# Patient Record
Sex: FEMALE | Race: WHITE | HISPANIC OR LATINO | Employment: STUDENT | URBAN - METROPOLITAN AREA
[De-identification: names, ages, dates, MRNs, and addresses within clinical notes are randomized per-mention and may not be internally consistent; named-entity substitution may affect disease eponyms.]

---
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
## 2025-01-05 NOTE — ED PROVIDER NOTES
Time reflects when diagnosis was documented in both MDM as applicable and the Disposition within this note       Time User Action Codes Description Comment    12/28/2024 12:54 AM Neymar Dacosta [G43.909] Migraine     12/28/2024 12:54 AM Neymar Dacosta [M54.9] Back pain           ED Disposition       ED Disposition   Discharge    Condition   Stable    Date/Time   Sat Dec 28, 2024 12:53 AM    Comment   Apoorva Tomlinson discharge to home/self care.                   Assessment & Plan       Medical Decision Making  Patient is a 19 y.o. female with PMH of cyst of the brain, who presents to the ED with complaint of headaches and nausea    Vital signs on arrival within normal limits  On exam patient is alert, oriented, no evidence respiratory distress, see physical exam for additional details    History and physical exam most consistent with generalized headache however, differential diagnosis included but not limited to ICH, migraine, viral illness, gastritis, plan point-of-care pregnancy, CT head, Robaxin/Benadryl/Toradol/Decadron/Reglan, Lidoderm patch, only reports of signout, 2 g mag    View ED course above for further discussion on patient workup.     CT head Noncon without evidence of acute intracranial abnormality    Point-of-care pregnancy negative    All labs reviewed and utilized in the medical decision making process  All radiology studies independently viewed by me and interpreted by the radiologist.  I reviewed all testing with the patient.     Upon re-evaluation Patient continues remain hemodynamically stable with no new symptom/complaint, patient's pain/migraines is improved, no nausea or vomiting    Plan for care discussed with patient, patient verbalized understanding, educated on symptoms concerning for return to the emergency department, PCP follow-up recommended.    Amount and/or Complexity of Data Reviewed  Labs: ordered.  Radiology: ordered.    Risk  Prescription drug management.        ED Course  "as of 01/05/25 0813   Fri Dec 27, 2024   2207 Headaches everyday / all day, when she gets one is supposed to come to the hospital, told secondary to known brain cyst should be evaluated, does not present every day, migraine cocktails do help, but headache returns the next day, spine pain started last month, not improving, today worsened, neck down to lower back, low back pain is new, -fevers, -cp, -sob, +nausea, -vomiting, some constipation, -incontinence, feet / legs / arms / hands, no change to sensation issues, -rash, -change in vision, neurology in [place], no plan for removal of cyst, no recent falls or change       Medications   sodium chloride 0.9 % bolus 1,000 mL (0 mL Intravenous Stopped 12/27/24 2355)   metoclopramide (REGLAN) injection 10 mg (10 mg Intravenous Given 12/27/24 2301)   ketorolac (TORADOL) injection 15 mg (15 mg Intravenous Given 12/27/24 2254)   diphenhydrAMINE (BENADRYL) injection 25 mg (25 mg Intravenous Given 12/27/24 2251)   magnesium sulfate 2 g/50 mL IVPB (premix) 2 g (0 g Intravenous Stopped 12/28/24 0047)   methocarbamol (ROBAXIN) tablet 500 mg (500 mg Oral Given 12/27/24 2249)   dexamethasone (PF) (DECADRON) injection 10 mg (10 mg Intravenous Given 12/27/24 2257)       ED Risk Strat Scores            CRAFFT      Flowsheet Row Most Recent Value   CRAFFT Initial Screen: During the past 12 months, did you:    1. Drink any alcohol (more than a few sips)?  No Filed at: 12/27/2024 2306   2. Smoke any marijuana or hashish No Filed at: 12/27/2024 2306   3. Use anything else to get high? (\"anything else\" includes illegal drugs, over the counter and prescription drugs, and things that you sniff or 'robb')? No Filed at: 12/27/2024 2306                                          History of Present Illness       Chief Complaint   Patient presents with    Back Pain     Pt has been having back pain for the past month, states pain involves her whole spine. Denies injury    Headache     Pt has hx " of a cyst on her brain, has not had an MRI since her last MRI in April. Has been having a worsening headache & pressure in her head and neck for past 2 months. Endorses some blurry vision       Past Medical History:   Diagnosis Date    Cyst of brain     Migraine       Past Surgical History:   Procedure Laterality Date    APPENDECTOMY LAPAROSCOPIC N/A 7/25/2022    Procedure: APPENDECTOMY LAPAROSCOPIC;  Surgeon: Hai Nelson MD;  Location: [facility];  Service: General      No family history on file.   Social History     Tobacco Use    Smoking status: Never    Smokeless tobacco: Never   Vaping Use    Vaping status: Never Used   Substance Use Topics    Alcohol use: Never    Drug use: Never      E-Cigarette/Vaping    E-Cigarette Use Never User       E-Cigarette/Vaping Substances      I have reviewed and agree with the history as documented.     Headaches everyday / all day, when she gets one is supposed to come to the hospital, told secondary to known brain cyst should be evaluated, does not present every day, migraine cocktails do help, but headache returns the next day, spine pain started last month, not improving, today worsened, neck down to lower back, low back pain is new, -fevers, -cp, -sob, +nausea, -vomiting, some constipation, -incontinence, feet / legs / arms / hands, no change to sensation issues, -rash, -change in vision, neurology in [place], no plan for removal of cyst, no recent falls or change        Review of Systems        Objective       ED Triage Vitals   Temperature Pulse Blood Pressure Respirations SpO2 Patient Position - Orthostatic VS   12/27/24 2132 12/27/24 2130 12/27/24 2130 12/27/24 2130 12/27/24 2130 12/27/24 2130   98 °F (36.7 °C) 91 115/79 18 100 % Lying      Temp Source Heart Rate Source BP Location FiO2 (%) Pain Score    12/27/24 2132 12/27/24 2130 12/27/24 2130 -- 12/27/24 2156    Oral Monitor Right arm  9      Vitals      Date and Time Temp Pulse SpO2 Resp BP Pain Score FACES  Pain Rating User   12/27/24 2254 -- -- -- -- -- 9 -- ED   12/27/24 2200 -- 80 100 % -- 142/66 -- -- ED   12/27/24 2157 -- -- -- -- -- 9 -- ED   12/27/24 2156 -- -- 98 % 18 135/83 9 -- ED   12/27/24 2132 98 °F (36.7 °C) -- -- -- -- -- -- PT   12/27/24 2130 -- 91 100 % 18 115/79 -- -- PT            Physical Exam  Vitals and nursing note reviewed.   Constitutional:       General: She is not in acute distress.     Appearance: She is well-developed. She is not ill-appearing or toxic-appearing.   HENT:      Head: Normocephalic and atraumatic.      Nose: No congestion or rhinorrhea.      Mouth/Throat:      Pharynx: No oropharyngeal exudate or posterior oropharyngeal erythema.   Eyes:      Conjunctiva/sclera: Conjunctivae normal.   Cardiovascular:      Rate and Rhythm: Normal rate and regular rhythm.      Heart sounds: No murmur heard.  Pulmonary:      Effort: Pulmonary effort is normal. No respiratory distress.      Breath sounds: Normal breath sounds. No wheezing, rhonchi or rales.   Abdominal:      Palpations: Abdomen is soft.      Tenderness: There is abdominal tenderness. There is no guarding or rebound.      Comments: Diffuse abdominal tenderness without evidence of distention   Musculoskeletal:         General: No swelling.      Cervical back: Neck supple.   Skin:     General: Skin is warm and dry.      Capillary Refill: Capillary refill takes less than 2 seconds.   Neurological:      Mental Status: She is alert.   Psychiatric:         Mood and Affect: Mood normal.         Results Reviewed       Procedure Component Value Units Date/Time    POCT pregnancy, urine [[id number]]  (Normal) Collected: 12/27/24 2312    Lab Status: Final result Updated: 12/27/24 2312     EXT Preg Test, Ur Negative     Control Valid            CT head without contrast   Final Interpretation by Miguel Oliva MD (12/28 0038)      No acute intracranial abnormality.                  Workstation performed: [id number]             Procedures    ED  Medication and Procedure Management   Prior to Admission Medications   Prescriptions Last Dose Informant Patient Reported? Taking?   Atogepant 60 MG TABS   No No   Sig: Take 60 mg by mouth in the morning   acetaminophen (TYLENOL) 500 mg tablet   No No   Sig: Take 2 tablets (1,000 mg total) by mouth every 6 (six) hours as needed for mild pain or moderate pain   amitriptyline (ELAVIL) 10 mg tablet   No No   Sig: Take 1 tablet (10 mg total) by mouth daily at bedtime   docusate sodium (COLACE) 100 mg capsule   No No   Sig: Take 1 capsule (100 mg total) by mouth 2 (two) times a day   ibuprofen (MOTRIN) 400 mg tablet   No No   Sig: Take 1.5 tablets (600 mg total) by mouth every 6 (six) hours as needed for mild pain or moderate pain   medroxyPROGESTERone acetate (DEPO-PROVERA SYRINGE) 150 mg/mL injection   Yes No   Sig: Inject 1 mL into a muscle every 3 (three) months   Patient not taking: Reported on 10/24/2024   methocarbamol (ROBAXIN) 500 mg tablet   No No   Sig: Take 1 tablet (500 mg total) by mouth every 6 (six) hours as needed for muscle spasms   ondansetron (ZOFRAN-ODT) 4 mg disintegrating tablet   No No   Sig: Take 1 tablet (4 mg total) by mouth every 6 (six) hours as needed for nausea or vomiting for up to 10 days   oxybutynin (DITROPAN-XL) 10 MG 24 hr tablet   Yes No   Sig: Take 10 mg by mouth daily   pantoprazole (PROTONIX) 40 mg tablet   No No   Sig: Take 1 tablet (40 mg total) by mouth daily   polyethylene glycol (MIRALAX) 17 g packet   No No   Sig: Take 17 g by mouth daily as needed (constipation, use while on narcotics)   Patient not taking: Reported on 10/24/2024   rizatriptan (MAXALT) 5 mg tablet   Yes No   Sig: Take 5 mg by mouth   topiramate (TOPAMAX) 50 MG tablet   Yes No   Sig: Take 50 mg by mouth   Patient not taking: Reported on 10/24/2024      Facility-Administered Medications: None     Discharge Medication List as of 12/28/2024  1:09 AM        CONTINUE these medications which have NOT CHANGED     Details   acetaminophen (TYLENOL) 500 mg tablet Take 2 tablets (1,000 mg total) by mouth every 6 (six) hours as needed for mild pain or moderate pain, Starting Sun 7/21/2024, Normal      amitriptyline (ELAVIL) 10 mg tablet Take 1 tablet (10 mg total) by mouth daily at bedtime, Starting Tue 5/14/2024, Normal      Atogepant 60 MG TABS Take 60 mg by mouth in the morning, Starting Thu 10/24/2024, Normal      docusate sodium (COLACE) 100 mg capsule Take 1 capsule (100 mg total) by mouth 2 (two) times a day, Starting Sun 7/21/2024, Normal      ibuprofen (MOTRIN) 400 mg tablet Take 1.5 tablets (600 mg total) by mouth every 6 (six) hours as needed for mild pain or moderate pain, Starting Sun 7/21/2024, Normal      medroxyPROGESTERone acetate (DEPO-PROVERA SYRINGE) 150 mg/mL injection Inject 1 mL into a muscle every 3 (three) months, Starting Thu 6/16/2022, Historical Med      methocarbamol (ROBAXIN) 500 mg tablet Take 1 tablet (500 mg total) by mouth every 6 (six) hours as needed for muscle spasms, Starting Sun 7/21/2024, Normal      ondansetron (ZOFRAN-ODT) 4 mg disintegrating tablet Take 1 tablet (4 mg total) by mouth every 6 (six) hours as needed for nausea or vomiting for up to 10 days, Starting Wed 7/17/2024, Until Thu 10/24/2024 at 2359, Normal      oxybutynin (DITROPAN-XL) 10 MG 24 hr tablet Take 10 mg by mouth daily, Starting Mon 6/17/2024, Until Tue 6/17/2025, Historical Med      pantoprazole (PROTONIX) 40 mg tablet Take 1 tablet (40 mg total) by mouth daily, Starting Sun 7/21/2024, Normal      polyethylene glycol (MIRALAX) 17 g packet Take 17 g by mouth daily as needed (constipation, use while on narcotics), Starting Sun 7/21/2024, OTC      rizatriptan (MAXALT) 5 mg tablet Take 5 mg by mouth, Starting Thu 1/11/2024, Until Fri 1/10/2025 at 2359, Historical Med      topiramate (TOPAMAX) 50 MG tablet Take 50 mg by mouth, Starting Wed 4/10/2024, Until u 4/10/2025 at 2359, Historical Med           No discharge  procedures on file.  ED SEPSIS DOCUMENTATION   Time reflects when diagnosis was documented in both MDM as applicable and the Disposition within this note       Time User Action Codes Description Comment    12/28/2024 12:54 AM Neymar Dacosta [G43.909] Migraine     12/28/2024 12:54 AM Neymar Dacosta [M54.9] Back pain                  Neymar Dacosta DO  01/05/25 0813

## 2025-01-26 ENCOUNTER — HOSPITAL ENCOUNTER (EMERGENCY)
Facility: HOSPITAL | Age: 20
Discharge: HOME | End: 2025-01-26
Attending: EMERGENCY MEDICINE | Admitting: EMERGENCY MEDICINE
Payer: COMMERCIAL

## 2025-01-26 VITALS
RESPIRATION RATE: 18 BRPM | TEMPERATURE: 98.4 F | OXYGEN SATURATION: 99 % | BODY MASS INDEX: 19.99 KG/M2 | DIASTOLIC BLOOD PRESSURE: 71 MMHG | WEIGHT: 120.15 LBS | HEART RATE: 83 BPM | SYSTOLIC BLOOD PRESSURE: 123 MMHG

## 2025-01-26 DIAGNOSIS — R30.0 DYSURIA: Primary | ICD-10-CM

## 2025-01-26 LAB
BILIRUB UR QL STRIP: NEGATIVE
CLARITY UR: CLEAR
COLOR UR: YELLOW
EXT PREGNANCY TEST URINE: NEGATIVE
EXT. CONTROL: NORMAL
GLUCOSE UR STRIP-MCNC: NEGATIVE MG/DL
HGB UR QL STRIP.AUTO: NEGATIVE
KETONES UR STRIP-MCNC: NEGATIVE MG/DL
LEUKOCYTE ESTERASE UR QL STRIP: NEGATIVE
NITRITE UR QL STRIP: NEGATIVE
PH UR STRIP.AUTO: 7 [PH]
PROT UR STRIP-MCNC: NEGATIVE MG/DL
SP GR UR STRIP.AUTO: 1.01 (ref 1–1.04)
UROBILINOGEN UA: NEGATIVE MG/DL

## 2025-01-26 PROCEDURE — 81003 URINALYSIS AUTO W/O SCOPE: CPT | Performed by: EMERGENCY MEDICINE

## 2025-01-26 PROCEDURE — 81025 URINE PREGNANCY TEST: CPT | Performed by: EMERGENCY MEDICINE

## 2025-01-26 PROCEDURE — 99285 EMERGENCY DEPT VISIT HI MDM: CPT

## 2025-01-26 PROCEDURE — 99284 EMERGENCY DEPT VISIT MOD MDM: CPT | Performed by: EMERGENCY MEDICINE

## 2025-01-26 RX ORDER — PHENAZOPYRIDINE HYDROCHLORIDE 200 MG/1
200 TABLET, FILM COATED ORAL 3 TIMES DAILY
Qty: 6 TABLET | Refills: 0 | Status: SHIPPED | OUTPATIENT
Start: 2025-01-26

## 2025-01-26 RX ORDER — ACETAMINOPHEN 325 MG/1
975 TABLET ORAL ONCE
Status: COMPLETED | OUTPATIENT
Start: 2025-01-26 | End: 2025-01-26

## 2025-01-26 RX ADMIN — ACETAMINOPHEN 975 MG: 325 TABLET, FILM COATED ORAL at 19:41

## 2025-01-27 NOTE — ED PROVIDER NOTES
"Time reflects when diagnosis was documented in both MDM as applicable and the Disposition within this note       Time User Action Codes Description Comment    1/26/2025  7:43 PM Arnel Drake Add [R30.0] Dysuria           ED Disposition       ED Disposition   Discharge    Condition   Stable    Date/Time   Sun Jan 26, 2025  7:43 PM    Comment   Apoorva ORDAZ Davi discharge to home/self care.                   Assessment & Plan       Medical Decision Making  Problems Addressed:  Dysuria:     Details: 2 days of symptoms.  Preg neg.  UA neg.  Pyridium for symptoms.  No fever, vss no acute abdomen on exam.      Amount and/or Complexity of Data Reviewed  Independent Historian: parent  Labs: ordered. Decision-making details documented in ED Course.    Risk  OTC drugs.  Prescription drug management.        ED Course as of 01/26/25 1951   Sun Jan 26, 2025 1936 PREGNANCY TEST URINE: Negative       Medications   acetaminophen (TYLENOL) tablet 975 mg (975 mg Oral Given 1/26/25 1941)       ED Risk Strat Scores            CRAFFT      Flowsheet Row Most Recent Value   CRAFFT Initial Screen: During the past 12 months, did you:    1. Drink any alcohol (more than a few sips)?  No Filed at: 01/26/2025 1904   2. Smoke any marijuana or hashish No Filed at: 01/26/2025 1904   3. Use anything else to get high? (\"anything else\" includes illegal drugs, over the counter and prescription drugs, and things that you sniff or 'robb')? No Filed at: 01/26/2025 1904                                          History of Present Illness       Chief Complaint   Patient presents with    Possible UTI     Pt reports urinary frequency, pain when urinating, and kidney pain.  Prior hx of kidney infections with admission.  No fevers at home.        Past Medical History:   Diagnosis Date    Cyst of brain     Migraine       Past Surgical History:   Procedure Laterality Date    APPENDECTOMY LAPAROSCOPIC N/A 7/25/2022    Procedure: APPENDECTOMY LAPAROSCOPIC;  " Surgeon: Hai Nelson MD;  Location: [facility] OR;  Service: General      History reviewed. No pertinent family history.   Social History     Tobacco Use    Smoking status: Never    Smokeless tobacco: Never   Vaping Use    Vaping status: Never Used   Substance Use Topics    Alcohol use: Never    Drug use: Never      E-Cigarette/Vaping    E-Cigarette Use Never User       E-Cigarette/Vaping Substances      I have reviewed and agree with the history as documented.     Patient is a 20-year-old female with a h/o pyelonephritis who presents with 2 days of urinary frequency and low back pain.  No meds taken.  +nausea last night.  No fever.  No vaginal bleeding.          Review of Systems   Constitutional: Negative.    HENT: Negative.     Eyes: Negative.    Respiratory: Negative.     Cardiovascular: Negative.    Gastrointestinal:  Positive for nausea.   Endocrine: Negative.    Genitourinary:  Positive for frequency and urgency.   Musculoskeletal:  Positive for back pain.   Skin: Negative.    Allergic/Immunologic: Negative.    Neurological: Negative.    Hematological: Negative.    Psychiatric/Behavioral: Negative.     All other systems reviewed and are negative.          Objective       ED Triage Vitals [01/26/25 1901]   Temperature Pulse Blood Pressure Respirations SpO2 Patient Position - Orthostatic VS   98.4 °F (36.9 °C) 83 123/71 18 99 % Sitting      Temp Source Heart Rate Source BP Location FiO2 (%) Pain Score    Oral Monitor Left arm -- --      Vitals      Date and Time Temp Pulse SpO2 Resp BP Pain Score FACES Pain Rating User   01/26/25 1901 98.4 °F (36.9 °C) 83 99 % 18 123/71 -- -- [place]            Physical Exam  Vitals reviewed.   Constitutional:       Appearance: Normal appearance. She is normal weight.   HENT:      Head: Normocephalic and atraumatic.   Cardiovascular:      Rate and Rhythm: Normal rate and regular rhythm.      Pulses: Normal pulses.      Heart sounds: Normal heart sounds.   Pulmonary:      Effort:  Pulmonary effort is normal.      Breath sounds: Normal breath sounds.   Abdominal:      General: Bowel sounds are normal.      Palpations: Abdomen is soft.      Tenderness: There is no abdominal tenderness. There is no right CVA tenderness, left CVA tenderness or guarding.   Musculoskeletal:         General: Normal range of motion.      Cervical back: Normal range of motion and neck supple.   Skin:     General: Skin is warm.      Capillary Refill: Capillary refill takes less than 2 seconds.   Neurological:      General: No focal deficit present.      Mental Status: She is alert and oriented to person, place, and time.   Psychiatric:         Mood and Affect: Mood normal.         Behavior: Behavior normal.         Results Reviewed       Procedure Component Value Units Date/Time    UA w Reflex to Microscopic w Reflex to Culture [[id number]]  (Normal) Collected: 01/26/25 1920    Lab Status: Final result Specimen: Urine, Clean Catch Updated: 01/26/25 1937     Color, UA Yellow     Clarity, UA Clear     Specific Gravity, UA 1.010     pH, UA 7.0     Leukocytes, UA Negative     Nitrite, UA Negative     Protein, UA Negative mg/dl      Glucose, UA Negative mg/dl      Ketones, UA Negative mg/dl      Bilirubin, UA Negative     Occult Blood, UA Negative     UROBILINOGEN UA Negative mg/dL     POCT pregnancy, urine [[id number]]  (Normal) Collected: 01/26/25 1924    Lab Status: Final result Updated: 01/26/25 1924     EXT Preg Test, Ur Negative     Control Valid            No orders to display       Procedures    ED Medication and Procedure Management   Prior to Admission Medications   Prescriptions Last Dose Informant Patient Reported? Taking?   Atogepant 60 MG TABS   No No   Sig: Take 60 mg by mouth in the morning   acetaminophen (TYLENOL) 500 mg tablet   No No   Sig: Take 2 tablets (1,000 mg total) by mouth every 6 (six) hours as needed for mild pain or moderate pain   amitriptyline (ELAVIL) 10 mg tablet   No No   Sig: Take 1 tablet  (10 mg total) by mouth daily at bedtime   docusate sodium (COLACE) 100 mg capsule   No No   Sig: Take 1 capsule (100 mg total) by mouth 2 (two) times a day   ibuprofen (MOTRIN) 400 mg tablet   No No   Sig: Take 1.5 tablets (600 mg total) by mouth every 6 (six) hours as needed for mild pain or moderate pain   medroxyPROGESTERone acetate (DEPO-PROVERA SYRINGE) 150 mg/mL injection   Yes No   Sig: Inject 1 mL into a muscle every 3 (three) months   Patient not taking: Reported on 10/24/2024   methocarbamol (ROBAXIN) 500 mg tablet   No No   Sig: Take 1 tablet (500 mg total) by mouth every 6 (six) hours as needed for muscle spasms   ondansetron (ZOFRAN-ODT) 4 mg disintegrating tablet   No No   Sig: Take 1 tablet (4 mg total) by mouth every 6 (six) hours as needed for nausea or vomiting for up to 10 days   oxybutynin (DITROPAN-XL) 10 MG 24 hr tablet   Yes No   Sig: Take 10 mg by mouth daily   pantoprazole (PROTONIX) 40 mg tablet   No No   Sig: Take 1 tablet (40 mg total) by mouth daily   polyethylene glycol (MIRALAX) 17 g packet   No No   Sig: Take 17 g by mouth daily as needed (constipation, use while on narcotics)   Patient not taking: Reported on 10/24/2024   rizatriptan (MAXALT) 5 mg tablet   Yes No   Sig: Take 5 mg by mouth   topiramate (TOPAMAX) 50 MG tablet   Yes No   Sig: Take 50 mg by mouth   Patient not taking: Reported on 10/24/2024      Facility-Administered Medications: None     Discharge Medication List as of 1/26/2025  7:44 PM        START taking these medications    Details   phenazopyridine (PYRIDIUM) 200 mg tablet Take 1 tablet (200 mg total) by mouth 3 (three) times a day, Starting Sun 1/26/2025, Normal           CONTINUE these medications which have NOT CHANGED    Details   acetaminophen (TYLENOL) 500 mg tablet Take 2 tablets (1,000 mg total) by mouth every 6 (six) hours as needed for mild pain or moderate pain, Starting Sun 7/21/2024, Normal      amitriptyline (ELAVIL) 10 mg tablet Take 1 tablet (10 mg  total) by mouth daily at bedtime, Starting Tue 5/14/2024, Normal      Atogepant 60 MG TABS Take 60 mg by mouth in the morning, Starting Thu 10/24/2024, Normal      docusate sodium (COLACE) 100 mg capsule Take 1 capsule (100 mg total) by mouth 2 (two) times a day, Starting Sun 7/21/2024, Normal      ibuprofen (MOTRIN) 400 mg tablet Take 1.5 tablets (600 mg total) by mouth every 6 (six) hours as needed for mild pain or moderate pain, Starting Sun 7/21/2024, Normal      medroxyPROGESTERone acetate (DEPO-PROVERA SYRINGE) 150 mg/mL injection Inject 1 mL into a muscle every 3 (three) months, Starting Thu 6/16/2022, Historical Med      methocarbamol (ROBAXIN) 500 mg tablet Take 1 tablet (500 mg total) by mouth every 6 (six) hours as needed for muscle spasms, Starting Sun 7/21/2024, Normal      ondansetron (ZOFRAN-ODT) 4 mg disintegrating tablet Take 1 tablet (4 mg total) by mouth every 6 (six) hours as needed for nausea or vomiting for up to 10 days, Starting Wed 7/17/2024, Until Thu 10/24/2024 at 2359, Normal      oxybutynin (DITROPAN-XL) 10 MG 24 hr tablet Take 10 mg by mouth daily, Starting Mon 6/17/2024, Until Tue 6/17/2025, Historical Med      pantoprazole (PROTONIX) 40 mg tablet Take 1 tablet (40 mg total) by mouth daily, Starting Sun 7/21/2024, Normal      polyethylene glycol (MIRALAX) 17 g packet Take 17 g by mouth daily as needed (constipation, use while on narcotics), Starting Sun 7/21/2024, OTC      rizatriptan (MAXALT) 5 mg tablet Take 5 mg by mouth, Starting Thu 1/11/2024, Until Fri 1/10/2025 at 2359, Historical Med      topiramate (TOPAMAX) 50 MG tablet Take 50 mg by mouth, Starting Wed 4/10/2024, Until Thu 4/10/2025 at 2359, Historical Med           No discharge procedures on file.  ED SEPSIS DOCUMENTATION   Time reflects when diagnosis was documented in both MDM as applicable and the Disposition within this note       Time User Action Codes Description Comment    1/26/2025  7:43 PM Arnel Drake Add  [R30.0] Dysuria                  Arnel Drake MD  01/26/25 1951

## 2025-02-25 ENCOUNTER — TELEPHONE (OUTPATIENT)
Dept: NEUROLOGY | Facility: CLINIC | Age: 20
End: 2025-02-25

## 2025-04-15 ENCOUNTER — HOSPITAL ENCOUNTER (EMERGENCY)
Facility: HOSPITAL | Age: 20
Discharge: HOME | End: 2025-04-16
Attending: EMERGENCY MEDICINE
Payer: COMMERCIAL

## 2025-04-15 VITALS
SYSTOLIC BLOOD PRESSURE: 123 MMHG | HEART RATE: 79 BPM | RESPIRATION RATE: 18 BRPM | TEMPERATURE: 97.1 F | OXYGEN SATURATION: 99 % | DIASTOLIC BLOOD PRESSURE: 78 MMHG

## 2025-04-15 DIAGNOSIS — R51.9 HEADACHE: Primary | ICD-10-CM

## 2025-04-15 PROCEDURE — 99284 EMERGENCY DEPT VISIT MOD MDM: CPT | Performed by: EMERGENCY MEDICINE

## 2025-04-15 PROCEDURE — 99283 EMERGENCY DEPT VISIT LOW MDM: CPT

## 2025-04-15 RX ORDER — KETOROLAC TROMETHAMINE 30 MG/ML
15 INJECTION, SOLUTION INTRAMUSCULAR; INTRAVENOUS ONCE
Status: COMPLETED | OUTPATIENT
Start: 2025-04-16 | End: 2025-04-16

## 2025-04-15 RX ORDER — METOCLOPRAMIDE HYDROCHLORIDE 5 MG/ML
10 INJECTION INTRAMUSCULAR; INTRAVENOUS ONCE
Status: COMPLETED | OUTPATIENT
Start: 2025-04-16 | End: 2025-04-16

## 2025-04-15 RX ORDER — ACETAMINOPHEN 325 MG/1
650 TABLET ORAL ONCE
Status: COMPLETED | OUTPATIENT
Start: 2025-04-16 | End: 2025-04-16

## 2025-04-15 RX ORDER — MAGNESIUM SULFATE HEPTAHYDRATE 40 MG/ML
2 INJECTION, SOLUTION INTRAVENOUS ONCE
Status: COMPLETED | OUTPATIENT
Start: 2025-04-16 | End: 2025-04-16

## 2025-04-15 RX ORDER — DIPHENHYDRAMINE HYDROCHLORIDE 50 MG/ML
25 INJECTION, SOLUTION INTRAMUSCULAR; INTRAVENOUS ONCE
Status: COMPLETED | OUTPATIENT
Start: 2025-04-16 | End: 2025-04-16

## 2025-04-15 NOTE — Clinical Note
Apoorva Tomlinson was seen and treated in our emergency department on 4/15/2025.                Diagnosis: ER Evaluation and treatment    Apoorva  is off the rest of the shift today.    She may return on this date: 04/17/2025         If you have any questions or concerns, please don't hesitate to call.      Tracie Staley, DO    ______________________________           _______________          _______________  Hospital Representative                              Date                                Time

## 2025-04-16 PROCEDURE — 96375 TX/PRO/DX INJ NEW DRUG ADDON: CPT

## 2025-04-16 PROCEDURE — 96365 THER/PROPH/DIAG IV INF INIT: CPT

## 2025-04-16 RX ADMIN — MAGNESIUM SULFATE HEPTAHYDRATE 2 G: 40 INJECTION, SOLUTION INTRAVENOUS at 00:31

## 2025-04-16 RX ADMIN — KETOROLAC TROMETHAMINE 15 MG: 30 INJECTION, SOLUTION INTRAMUSCULAR; INTRAVENOUS at 00:08

## 2025-04-16 RX ADMIN — SODIUM CHLORIDE 1000 ML: 0.9 INJECTION, SOLUTION INTRAVENOUS at 00:09

## 2025-04-16 RX ADMIN — ACETAMINOPHEN 650 MG: 325 TABLET, FILM COATED ORAL at 00:05

## 2025-04-16 RX ADMIN — DIPHENHYDRAMINE HYDROCHLORIDE 25 MG: 50 INJECTION, SOLUTION INTRAMUSCULAR; INTRAVENOUS at 00:06

## 2025-04-16 RX ADMIN — METOCLOPRAMIDE 10 MG: 5 INJECTION, SOLUTION INTRAMUSCULAR; INTRAVENOUS at 00:08

## 2025-04-16 NOTE — ED PROVIDER NOTES
Time reflects when diagnosis was documented in both MDM as applicable and the Disposition within this note       Time User Action Codes Description Comment    4/16/2025  1:32 AM Tracie Del Castillo [R51.9] Headache           ED Disposition       ED Disposition   Discharge    Condition   Stable    Date/Time   Wed Apr 16, 2025  1:32 AM    Comment   Apoorva Tomlinson discharge to home/self care.                   Assessment & Plan       Medical Decision Making  ASSESSMENT: Patient is a 20 y.o. female who presents for evaluation of headache . No infectious symptoms or focal neuro findings.  DDX includes but not limited to: tension headache versus migraine headache.  PLAN: Will treat symptomatically and reevaluate.    Patient reevaluated. Denies any new or worsening complaints or concerns at this time. Headache has resolved, patient feels comfortable with and is requesting discharge. Discussed evaluation with findings and plan with patient. Advised on need for outpatient follow up with PCP and/or specialists. Given extensive return precautions verbally as well as in discharge instructions, confirmed with teach back method. All questions answered prior to discharge with verbal understanding and agreement with plan expressed. Patient remained stable during entire emergency department evaluation and was discharged in stable condition.    Risk  OTC drugs.  Prescription drug management.             Medications   sodium chloride 0.9 % bolus 1,000 mL (0 mL Intravenous Stopped 4/16/25 0111)   metoclopramide (REGLAN) injection 10 mg (10 mg Intravenous Given 4/16/25 0008)   ketorolac (TORADOL) injection 15 mg (15 mg Intravenous Given 4/16/25 0008)   diphenhydrAMINE (BENADRYL) injection 25 mg (25 mg Intravenous Given 4/16/25 0006)   magnesium sulfate 2 g/50 mL IVPB (premix) 2 g (0 g Intravenous Stopped 4/16/25 0110)   acetaminophen (TYLENOL) tablet 650 mg (650 mg Oral Given 4/16/25 0005)       ED Risk Strat Scores                     No data recorded                            History of Present Illness       Chief Complaint   Patient presents with    Headache     Pt reports headache/nausea since yesterday that has gotten worse. Pt reports brain cyst and is supposed to come to ER for pain        Past Medical History:   Diagnosis Date    Cyst of brain     Migraine       Past Surgical History:   Procedure Laterality Date    APPENDECTOMY LAPAROSCOPIC N/A 7/25/2022    Procedure: APPENDECTOMY LAPAROSCOPIC;  Surgeon: Hai Nelson MD;  Location: [facility] OR;  Service: General      History reviewed. No pertinent family history.   Social History     Tobacco Use    Smoking status: Never    Smokeless tobacco: Never   Vaping Use    Vaping status: Never Used   Substance Use Topics    Alcohol use: Never    Drug use: Never      E-Cigarette/Vaping    E-Cigarette Use Never User       E-Cigarette/Vaping Substances      I have reviewed and agree with the history as documented.     HPI  Patient is a 20 y.o. female with PMHx headache who presents to the ED for evaluation of headache that began yesterday. Patient reports frequent headaches that she sees neurology for and has multiple abortive medications at home. She took benadryl and topamax at home without relief so she came to the ED. She has some noise sensitivity. States her headache is otherwise similar to prior. She denies any other complaints or concerns at this time.    Review of Systems  All other systems reviewed and negative unless otherwise stated in HPI above.    Objective       ED Triage Vitals [04/15/25 2340]   Temperature Pulse Blood Pressure Respirations SpO2 Patient Position - Orthostatic VS   (!) 97.1 °F (36.2 °C) 79 123/78 18 99 % Sitting      Temp Source Heart Rate Source BP Location FiO2 (%) Pain Score    Temporal Monitor Left arm -- 8      Vitals      Date and Time Temp Pulse SpO2 Resp BP Pain Score FACES Pain Rating User   04/16/25 0008 -- -- -- -- -- 8 -- RG   04/16/25 0005  -- -- -- -- -- 8 --    04/15/25 2340 97.1 °F (36.2 °C) 79 99 % 18 123/78 8 --             Physical Exam  General: Awake, alert. NAD.  Head: Normocephalic, atraumatic.  Eyes: EOM-I. PERRL. No scleral icterus or conjunctival injection.  ENT: Atraumatic external nose and ears. No stridor. Normal phonation. MMM.  Neck: Symmetric, trachea midline. Supple.  CV: No murmurs. Peripheral pulses +2 throughout. Regular rhythm and normal rate.  Lungs: Unlabored. No retractions. No tachypnea. Lung sounds present and CTAB.  Abd: Flat, nondistended. +BS, soft, nontender.  MSK: FROM of all extremities, no deformity/injury.  Skin: Warm, dry. No rashes.  Neuro: GCS 15. No focal deficit.  Mental status is at baseline. No cranial nerve II-XII deficits.  No dysarthria or facial asymmetry.  Moving all extremities. Sensation, motor function, and coordination are grossly intact throughout.     Results Reviewed       None            No orders to display       Procedures    ED Medication and Procedure Management   Prior to Admission Medications   Prescriptions Last Dose Informant Patient Reported? Taking?   Atogepant 60 MG TABS   No No   Sig: Take 60 mg by mouth in the morning   acetaminophen (TYLENOL) 500 mg tablet   No No   Sig: Take 2 tablets (1,000 mg total) by mouth every 6 (six) hours as needed for mild pain or moderate pain   amitriptyline (ELAVIL) 10 mg tablet   No No   Sig: Take 1 tablet (10 mg total) by mouth daily at bedtime   docusate sodium (COLACE) 100 mg capsule   No No   Sig: Take 1 capsule (100 mg total) by mouth 2 (two) times a day   ibuprofen (MOTRIN) 400 mg tablet   No No   Sig: Take 1.5 tablets (600 mg total) by mouth every 6 (six) hours as needed for mild pain or moderate pain   medroxyPROGESTERone acetate (DEPO-PROVERA SYRINGE) 150 mg/mL injection   Yes No   Sig: Inject 1 mL into a muscle every 3 (three) months   Patient not taking: Reported on 10/24/2024   methocarbamol (ROBAXIN) 500 mg tablet   No No   Sig: Take 1  tablet (500 mg total) by mouth every 6 (six) hours as needed for muscle spasms   ondansetron (ZOFRAN-ODT) 4 mg disintegrating tablet   No No   Sig: Take 1 tablet (4 mg total) by mouth every 6 (six) hours as needed for nausea or vomiting for up to 10 days   oxybutynin (DITROPAN-XL) 10 MG 24 hr tablet   Yes No   Sig: Take 10 mg by mouth daily   pantoprazole (PROTONIX) 40 mg tablet   No No   Sig: Take 1 tablet (40 mg total) by mouth daily   phenazopyridine (PYRIDIUM) 200 mg tablet   No No   Sig: Take 1 tablet (200 mg total) by mouth 3 (three) times a day   polyethylene glycol (MIRALAX) 17 g packet   No No   Sig: Take 17 g by mouth daily as needed (constipation, use while on narcotics)   Patient not taking: Reported on 10/24/2024   rizatriptan (MAXALT) 5 mg tablet   Yes No   Sig: Take 5 mg by mouth   topiramate (TOPAMAX) 50 MG tablet   Yes No   Sig: Take 50 mg by mouth   Patient not taking: Reported on 10/24/2024      Facility-Administered Medications: None     Discharge Medication List as of 4/16/2025  1:34 AM        CONTINUE these medications which have NOT CHANGED    Details   acetaminophen (TYLENOL) 500 mg tablet Take 2 tablets (1,000 mg total) by mouth every 6 (six) hours as needed for mild pain or moderate pain, Starting Sun 7/21/2024, Normal      amitriptyline (ELAVIL) 10 mg tablet Take 1 tablet (10 mg total) by mouth daily at bedtime, Starting Tue 5/14/2024, Normal      Atogepant 60 MG TABS Take 60 mg by mouth in the morning, Starting Thu 10/24/2024, Normal      docusate sodium (COLACE) 100 mg capsule Take 1 capsule (100 mg total) by mouth 2 (two) times a day, Starting Sun 7/21/2024, Normal      ibuprofen (MOTRIN) 400 mg tablet Take 1.5 tablets (600 mg total) by mouth every 6 (six) hours as needed for mild pain or moderate pain, Starting Sun 7/21/2024, Normal      medroxyPROGESTERone acetate (DEPO-PROVERA SYRINGE) 150 mg/mL injection Inject 1 mL into a muscle every 3 (three) months, Starting Thu 6/16/2022,  Historical Med      methocarbamol (ROBAXIN) 500 mg tablet Take 1 tablet (500 mg total) by mouth every 6 (six) hours as needed for muscle spasms, Starting Sun 7/21/2024, Normal      ondansetron (ZOFRAN-ODT) 4 mg disintegrating tablet Take 1 tablet (4 mg total) by mouth every 6 (six) hours as needed for nausea or vomiting for up to 10 days, Starting Wed 7/17/2024, Until Thu 10/24/2024 at 2359, Normal      oxybutynin (DITROPAN-XL) 10 MG 24 hr tablet Take 10 mg by mouth daily, Starting Mon 6/17/2024, Until Tue 6/17/2025, Historical Med      pantoprazole (PROTONIX) 40 mg tablet Take 1 tablet (40 mg total) by mouth daily, Starting Sun 7/21/2024, Normal      phenazopyridine (PYRIDIUM) 200 mg tablet Take 1 tablet (200 mg total) by mouth 3 (three) times a day, Starting Sun 1/26/2025, Normal      polyethylene glycol (MIRALAX) 17 g packet Take 17 g by mouth daily as needed (constipation, use while on narcotics), Starting Sun 7/21/2024, OTC      rizatriptan (MAXALT) 5 mg tablet Take 5 mg by mouth, Starting Thu 1/11/2024, Until Fri 1/10/2025 at 2359, Historical Med      topiramate (TOPAMAX) 50 MG tablet Take 50 mg by mouth, Starting Wed 4/10/2024, Until Thu 4/10/2025 at 2359, Historical Med           No discharge procedures on file.  ED SEPSIS DOCUMENTATION   Time reflects when diagnosis was documented in both MDM as applicable and the Disposition within this note       Time User Action Codes Description Comment    4/16/2025  1:32 AM Tracie Del Castillo [R51.9] Headache                  Tracie Del Castillo DO  04/16/25 0359

## 2025-04-16 NOTE — ED ATTENDING ATTESTATION
4/15/2025  I, Miguel Nunez MD, saw and evaluated the patient. I have discussed the patient with the resident/non-physician practitioner and agree with the resident's/non-physician practitioner's findings, Plan of Care, and MDM as documented in the resident's/non-physician practitioner's note, except where noted. All available labs and Radiology studies were reviewed.  I was present for key portions of any procedure(s) performed by the resident/non-physician practitioner and I was immediately available to provide assistance.       At this point I agree with the current assessment done in the Emergency Department.  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  Subsequent CT scan in December 2024 did not show this finding  Patient has no neurologic symptoms no visual changes no fever no neck stiffness no focal weakness  EXAM:   Const:   well appearing   NAD     HEENT:  NCAT    sclera anicteric conjunctiva pink   throat clear, MMM    Neck:   supple  no meningismus  no jvd   no bruits  no  midline tenderness   Lungs:   clear  CW non-tender   No creiptation  Heart:   RRR no m/g/r  Normal pulses  Abd:   soft nt nd pos bs   Ext:    normal nontender  No edema  Neruo:   CN 2 -12 intact  motor intact 5/5 sensory intact cerebellar intact       Gait normal    IMPRESSION: Headache  PLAN: Symptomatic treatment    ED Course         Critical Care Time  Procedures

## 2025-04-16 NOTE — DISCHARGE INSTRUCTIONS
Please follow up with your PCP and your Neurologist as soon as possible.    Please return to the Emergency Department if you experience worsening of your current symptoms or any new/other concerning symptoms.

## 2025-05-30 ENCOUNTER — HOSPITAL ENCOUNTER (EMERGENCY)
Facility: HOSPITAL | Age: 20
Discharge: HOME | End: 2025-05-30
Attending: EMERGENCY MEDICINE
Payer: COMMERCIAL

## 2025-05-30 VITALS
RESPIRATION RATE: 16 BRPM | WEIGHT: 113.98 LBS | BODY MASS INDEX: 18.97 KG/M2 | HEART RATE: 83 BPM | DIASTOLIC BLOOD PRESSURE: 65 MMHG | TEMPERATURE: 97.9 F | SYSTOLIC BLOOD PRESSURE: 110 MMHG | OXYGEN SATURATION: 100 %

## 2025-05-30 DIAGNOSIS — N93.9 VAGINAL BLEEDING: Primary | ICD-10-CM

## 2025-05-30 DIAGNOSIS — N39.0 UTI (URINARY TRACT INFECTION): ICD-10-CM

## 2025-05-30 LAB
B-HCG SERPL-ACNC: <0.6 MIU/ML (ref 0–5)
BACTERIA UR QL AUTO: ABNORMAL /HPF
BILIRUB UR QL STRIP: NEGATIVE
CLARITY UR: ABNORMAL
COLOR UR: ABNORMAL
GLUCOSE UR STRIP-MCNC: NEGATIVE MG/DL
HGB UR QL STRIP.AUTO: ABNORMAL
KETONES UR STRIP-MCNC: NEGATIVE MG/DL
LEUKOCYTE ESTERASE UR QL STRIP: ABNORMAL
MUCOUS THREADS UR QL AUTO: ABNORMAL
NITRITE UR QL STRIP: NEGATIVE
NON-SQ EPI CELLS URNS QL MICRO: ABNORMAL /HPF
PH UR STRIP.AUTO: 6.5 [PH]
PROT UR STRIP-MCNC: ABNORMAL MG/DL
RBC #/AREA URNS AUTO: ABNORMAL /HPF
SP GR UR STRIP.AUTO: 1.03 (ref 1–1.03)
UROBILINOGEN UR STRIP-ACNC: <2 MG/DL
WBC #/AREA URNS AUTO: ABNORMAL /HPF

## 2025-05-30 PROCEDURE — 84702 CHORIONIC GONADOTROPIN TEST: CPT | Performed by: EMERGENCY MEDICINE

## 2025-05-30 PROCEDURE — 99284 EMERGENCY DEPT VISIT MOD MDM: CPT

## 2025-05-30 PROCEDURE — 36415 COLL VENOUS BLD VENIPUNCTURE: CPT | Performed by: EMERGENCY MEDICINE

## 2025-05-30 PROCEDURE — 87186 SC STD MICRODIL/AGAR DIL: CPT

## 2025-05-30 PROCEDURE — 87086 URINE CULTURE/COLONY COUNT: CPT

## 2025-05-30 PROCEDURE — 87591 N.GONORRHOEAE DNA AMP PROB: CPT | Performed by: EMERGENCY MEDICINE

## 2025-05-30 PROCEDURE — 99284 EMERGENCY DEPT VISIT MOD MDM: CPT | Performed by: EMERGENCY MEDICINE

## 2025-05-30 PROCEDURE — 87077 CULTURE AEROBIC IDENTIFY: CPT

## 2025-05-30 PROCEDURE — 81001 URINALYSIS AUTO W/SCOPE: CPT

## 2025-05-30 PROCEDURE — 87491 CHLMYD TRACH DNA AMP PROBE: CPT | Performed by: EMERGENCY MEDICINE

## 2025-05-30 NOTE — ED PROVIDER NOTES
Time reflects when diagnosis was documented in both MDM as applicable and the Disposition within this note       Time User Action Codes Description Comment    5/30/2025  7:27 PM Enid Mayes Add [N93.9] Vaginal bleeding           ED Disposition       ED Disposition   Discharge    Condition   Good    Date/Time   Fri May 30, 2025  7:27 PM    Comment   Apoorva Tomlinson discharge to home/self care.                   Assessment & Plan       Medical Decision Making  20-year-old female presents with vaginal spotting that started earlier today.  Patient took a pregnancy test 2 days ago and it was positive.  This will be patient's first pregnancy.  She states she gets her period twice a month.  Her last period was May 6 and she was supposed to get it again on May 23.  She also took Plan B on May 2 for unprotected sex.  On exam she is alert she is in no acute distress.  She has no abdominal tenderness on exam and is not complaining of abdominal pain.  She states she just urinated in the waiting room and so is unable to urinate.  Will order quant in order to expedient a workup on this patient as she states she has no urge to urinate.  Will rule out pregnancy.  If pregnancy is positive will proceed with ultrasound to rule out ectopic    Amount and/or Complexity of Data Reviewed  Labs: ordered. Decision-making details documented in ED Course.        ED Course as of 05/30/25 1927   Fri May 30, 2025   1819 Blood Pressure: 134/78   1820 Temperature: 97.9 °F (36.6 °C)   1820 Pulse: 79   1820 Respirations: 16   1820 SpO2: 97 %   1926 HCG QUANTITATIVE: <0.6   1926 Leukocytes, UA(!): Trace   1926 Nitrite, UA: Negative   1926 Blood, UA(!): Large       Medications - No data to display    ED Risk Strat Scores              CRAFFT      Flowsheet Row Most Recent Value   CRAFFT Initial Screen: During the past 12 months, did you:    1. Drink any alcohol (more than a few sips)?  No Filed at: 05/30/2025 6363   2. Smoke any marijuana or  "[place]lotte No Filed at: 05/30/2025 1751   3. Use anything else to get high? (\"anything else\" includes illegal drugs, over the counter and prescription drugs, and things that you sniff or 'robb')? No Filed at: 05/30/2025 1751              No data recorded                            History of Present Illness       Chief Complaint   Patient presents with    Vaginal Bleeding - Pregnant     Patient arrives with c/o vaginal bleeding beginning this morning. Patient states positive home pregnancy test on Wednesday.        Past Medical History[1]   Past Surgical History[2]   Family History[3]   Social History[4]   E-Cigarette/Vaping    E-Cigarette Use Never User       E-Cigarette/Vaping Substances      I have reviewed and agree with the history as documented.     20-year-old female with history of migraine headaches presents to the ED with vaginal bleeding.  Patient states she took a pregnancy test 2 days ago and it was positive.  Today she started with vaginal spotting.  No abdominal pain.  She states her last menstrual period was May 6.  She states that she gets her menstrual period 2 times a month and was supposed to get it again on May 23 but it never came.  She also states that she did take Plan B on May 2.  Patient has had no prior pregnancies.      History provided by:  Patient   used: No    Vaginal Bleeding  Quality:  Spotting  Onset quality:  Gradual  Duration:  8 hours  Timing:  Intermittent  Progression:  Unchanged  Chronicity:  New  Menstrual history:  Regular  Number of pads used:  1  Possible pregnancy: yes    Context: spontaneously    Relieved by:  None tried  Worsened by:  Nothing  Ineffective treatments:  None tried  Associated symptoms: no abdominal pain, no back pain, no dizziness, no dysuria, no fatigue, no fever, no nausea and no vaginal discharge    Risk factors: unprotected sex    Risk factors: no hx of ectopic pregnancy, no hx of endometriosis, no gynecological surgery, no ovarian " cysts, no ovarian torsion, no prior miscarriage, no STD, no STD exposure and no terminated pregnancies        Review of Systems   Constitutional: Negative.  Negative for chills, diaphoresis, fatigue and fever.   HENT: Negative.  Negative for congestion, rhinorrhea and sore throat.    Eyes: Negative.  Negative for discharge, redness and itching.   Respiratory: Negative.  Negative for apnea, cough, chest tightness, shortness of breath and wheezing.    Cardiovascular:  Negative for chest pain, palpitations and leg swelling.   Gastrointestinal: Negative.  Negative for abdominal pain and nausea.   Endocrine: Negative.    Genitourinary:  Positive for vaginal bleeding. Negative for dysuria, flank pain, frequency, urgency and vaginal discharge.   Musculoskeletal: Negative.  Negative for back pain.   Skin: Negative.    Allergic/Immunologic: Negative.    Neurological: Negative.  Negative for dizziness, syncope, weakness, light-headedness, numbness and headaches.   Hematological: Negative.    All other systems reviewed and are negative.          Objective       ED Triage Vitals [05/30/25 1734]   Temperature Pulse Blood Pressure Respirations SpO2 Patient Position - Orthostatic VS   97.9 °F (36.6 °C) 79 134/78 16 97 % Sitting      Temp Source Heart Rate Source BP Location FiO2 (%) Pain Score    Oral Monitor Right arm -- --      Vitals      Date and Time Temp Pulse SpO2 Resp BP Pain Score FACES Pain Rating User   05/30/25 1734 97.9 °F (36.6 °C) 79 97 % 16 134/78 -- -- EK            Physical Exam  Vitals and nursing note reviewed.   Constitutional:       General: She is awake. She is not in acute distress.     Appearance: Normal appearance. She is well-developed and normal weight. She is not ill-appearing, toxic-appearing or diaphoretic.   HENT:      Head: Normocephalic and atraumatic.      Right Ear: External ear normal.      Left Ear: External ear normal.      Nose: Nose normal.     Eyes:      General: No scleral icterus.         Right eye: No discharge.         Left eye: No discharge.      Conjunctiva/sclera: Conjunctivae normal.     Neck:      Thyroid: No thyromegaly.      Vascular: No JVD.      Trachea: No tracheal deviation.   Abdominal:      General: Bowel sounds are normal. There is no distension.      Palpations: Abdomen is soft. There is no mass.      Tenderness: There is no abdominal tenderness.      Hernia: No hernia is present.     Skin:     General: Skin is warm and dry.      Coloration: Skin is not jaundiced or pale.      Findings: No bruising, erythema, lesion or rash.     Neurological:      General: No focal deficit present.      Mental Status: She is alert and oriented to person, place, and time.      Motor: No weakness or abnormal muscle tone.      Deep Tendon Reflexes: Reflexes are normal and symmetric.     Psychiatric:         Mood and Affect: Mood normal.         Behavior: Behavior is cooperative.         Results Reviewed       Procedure Component Value Units Date/Time    UA w Reflex to Microscopic w Reflex to Culture [[id number]]  (Abnormal) Collected: 05/30/25 1835    Lab Status: Final result Specimen: Urine, Clean Catch Updated: 05/30/25 1857     Color, UA Brown     Clarity, UA Turbid     Specific Gravity, UA 1.028     pH, UA 6.5     Leukocytes, UA Trace     Nitrite, UA Negative     Protein,  (2+) mg/dl      Glucose, UA Negative mg/dl      Ketones, UA Negative mg/dl      Urobilinogen, UA <2.0 mg/dl      Bilirubin, UA Negative     Occult Blood, UA Large    Urine Microscopic [[id number]] Collected: 05/30/25 1835    Lab Status: In process Specimen: Urine, Clean Catch Updated: 05/30/25 1857    Quantitative hCG [[id number]]  (Normal) Collected: 05/30/25 1827    Lab Status: Final result Specimen: Blood from Arm, Left Updated: 05/30/25 1853     HCG, Quant <0.6 mIU/mL     Narrative:       Expected Ranges:    HCG results between 5.0 and 25.0 mIU/mL may be indicative of early pregnancy but should be interpreted in light of  the total clinical presentation.    HCG can rise to detectable levels in benji and post menopausal women (0-11.6 mIU/mL).     Approximate               Approximate HCG  Gestation age          Concentration ( mIU/mL)  _____________          ______________________   Weeks                      HCG values  0.2-1                       5-50  1-2                           2-3                         100-5000  3-4                         500-04749  4-5                         1000-61012  5-6                         [id number]  6-8                         [id number]  8-12                        [id number]      Chlamydia/GC amplified DNA by PCR [[id number]] Updated: 05/30/25 1842    Lab Status: No result Specimen: Urine, Other             No orders to display       Procedures    ED Medication and Procedure Management   Prior to Admission Medications   Prescriptions Last Dose Informant Patient Reported? Taking?   Atogepant 60 MG TABS Not Taking  No No   Sig: Take 60 mg by mouth in the morning   Patient not taking: Reported on 5/30/2025   acetaminophen (TYLENOL) 500 mg tablet Not Taking  No No   Sig: Take 2 tablets (1,000 mg total) by mouth every 6 (six) hours as needed for mild pain or moderate pain   Patient not taking: Reported on 5/30/2025   amitriptyline (ELAVIL) 10 mg tablet Not Taking  No No   Sig: Take 1 tablet (10 mg total) by mouth daily at bedtime   Patient not taking: Reported on 5/30/2025   docusate sodium (COLACE) 100 mg capsule Not Taking  No No   Sig: Take 1 capsule (100 mg total) by mouth 2 (two) times a day   Patient not taking: Reported on 5/30/2025   ibuprofen (MOTRIN) 400 mg tablet Not Taking  No No   Sig: Take 1.5 tablets (600 mg total) by mouth every 6 (six) hours as needed for mild pain or moderate pain   Patient not taking: Reported on 5/30/2025   medroxyPROGESTERone acetate (DEPO-PROVERA SYRINGE) 150 mg/mL injection Not Taking  Yes No   Sig: Inject 1 mL into a muscle every 3 (three) months    Patient not taking: Reported on 5/30/2025   methocarbamol (ROBAXIN) 500 mg tablet Not Taking  No No   Sig: Take 1 tablet (500 mg total) by mouth every 6 (six) hours as needed for muscle spasms   Patient not taking: Reported on 5/30/2025   ondansetron (ZOFRAN-ODT) 4 mg disintegrating tablet   No No   Sig: Take 1 tablet (4 mg total) by mouth every 6 (six) hours as needed for nausea or vomiting for up to 10 days   oxybutynin (DITROPAN-XL) 10 MG 24 hr tablet Not Taking  Yes No   Sig: Take 10 mg by mouth daily   Patient not taking: Reported on 5/30/2025   pantoprazole (PROTONIX) 40 mg tablet Not Taking  No No   Sig: Take 1 tablet (40 mg total) by mouth daily   Patient not taking: Reported on 5/30/2025   phenazopyridine (PYRIDIUM) 200 mg tablet Not Taking  No No   Sig: Take 1 tablet (200 mg total) by mouth 3 (three) times a day   Patient not taking: Reported on 5/30/2025   polyethylene glycol (MIRALAX) 17 g packet Not Taking  No No   Sig: Take 17 g by mouth daily as needed (constipation, use while on narcotics)   Patient not taking: Reported on 5/30/2025   rizatriptan (MAXALT) 5 mg tablet   Yes No   Sig: Take 5 mg by mouth   topiramate (TOPAMAX) 50 MG tablet   Yes No   Sig: Take 50 mg by mouth   Patient not taking: Reported on 10/24/2024      Facility-Administered Medications: None     Patient's Medications   Discharge Prescriptions    No medications on file     No discharge procedures on file.  ED SEPSIS DOCUMENTATION   Time reflects when diagnosis was documented in both MDM as applicable and the Disposition within this note       Time User Action Codes Description Comment    5/30/2025  7:27 PM Enid Mayes Add [N93.9] Vaginal bleeding                    [1]   Past Medical History:  Diagnosis Date    Cyst of brain     Migraine    [2]   Past Surgical History:  Procedure Laterality Date    APPENDECTOMY LAPAROSCOPIC N/A 7/25/2022    Procedure: APPENDECTOMY LAPAROSCOPIC;  Surgeon: Hai Nelson MD;  Location: [facility];  Service: General   [3] No family history on file.  [4]   Social History  Tobacco Use    Smoking status: Never    Smokeless tobacco: Never   Vaping Use    Vaping status: Never Used   Substance Use Topics    Alcohol use: Never    Drug use: Never        Enid Mayes DO  05/30/25 1927

## 2025-06-01 ENCOUNTER — RESULTS FOLLOW-UP (OUTPATIENT)
Dept: OTHER | Facility: HOSPITAL | Age: 20
End: 2025-06-01

## 2025-06-01 LAB
BACTERIA UR CULT: ABNORMAL
BACTERIA UR CULT: ABNORMAL
C TRACH DNA SPEC QL NAA+PROBE: NEGATIVE
N GONORRHOEA DNA SPEC QL NAA+PROBE: NEGATIVE

## 2025-06-01 RX ORDER — CEPHALEXIN 500 MG/1
500 CAPSULE ORAL 2 TIMES DAILY
Qty: 14 CAPSULE | Refills: 0 | Status: SHIPPED | OUTPATIENT
Start: 2025-06-01 | End: 2025-06-08

## (undated) DEVICE — [HIGH FLOW INSUFFLATOR,  DO NOT USE IF PACKAGE IS DAMAGED,  KEEP DRY,  KEEP AWAY FROM SUNLIGHT,  PROTECT FROM HEAT AND RADIOACTIVE SOURCES.]: Brand: PNEUMOSURE

## (undated) DEVICE — PMI DISPOSABLE PUNCTURE CLOSURE DEVICE / SUTURE GRASPER: Brand: PMI

## (undated) DEVICE — 3000CC GUARDIAN II: Brand: GUARDIAN

## (undated) DEVICE — TROCAR: Brand: KII FIOS FIRST ENTRY

## (undated) DEVICE — SUT MONOCRYL 4-0 PS-2 27 IN Y426H

## (undated) DEVICE — BLUE HEAT SCOPE WARMER

## (undated) DEVICE — LIGAMAX 5 MM ENDOSCOPIC MULTIPLE CLIP APPLIER: Brand: LIGAMAX

## (undated) DEVICE — SCD SEQUENTIAL COMPRESSION COMFORT SLEEVE MEDIUM KNEE LENGTH: Brand: KENDALL SCD

## (undated) DEVICE — ETS45 RELOAD STANDARD 45MM: Brand: ENDOPATH

## (undated) DEVICE — CHLORAPREP HI-LITE 26ML ORANGE

## (undated) DEVICE — ENDOPATH ETS-FLEX45 ARTICULATING ENDOSCOPIC LINEAR CUTTER, NO RELOAD: Brand: ENDOPATH

## (undated) DEVICE — GLOVE INDICATOR PI UNDERGLOVE SZ 8 BLUE

## (undated) DEVICE — GLOVE INDICATOR PI UNDERGLOVE SZ 6.5 BLUE

## (undated) DEVICE — HARMONIC ACE 5MM DIAMETER SHEARS 36CM SHAFT LENGTH + ADAPTIVE TISSUE TECHNOLOGY FOR USE WITH GENERATOR G11: Brand: HARMONIC ACE

## (undated) DEVICE — IRRIG ENDO FLO TUBING

## (undated) DEVICE — GLOVE SRG BIOGEL 6.5

## (undated) DEVICE — SUT VICRYL 0 UR-6 27 IN J603H

## (undated) DEVICE — GLOVE SRG BIOGEL ECLIPSE 7.5

## (undated) DEVICE — TROCAR: Brand: KII SLEEVE

## (undated) DEVICE — ADHESIVE SKIN HIGH VISCOSITY EXOFIN 1ML

## (undated) DEVICE — PENCIL ELECTROSURG E-Z CLEAN -0035H

## (undated) DEVICE — 2000CC GUARDIAN II: Brand: GUARDIAN

## (undated) DEVICE — TRAY FOLEY 16FR URIMETER SURESTEP

## (undated) DEVICE — TISSUE RETRIEVAL SYSTEM: Brand: INZII RETRIEVAL SYSTEM

## (undated) DEVICE — DRAPE EQUIPMENT RF WAND

## (undated) DEVICE — INTENDED FOR TISSUE SEPARATION, AND OTHER PROCEDURES THAT REQUIRE A SHARP SURGICAL BLADE TO PUNCTURE OR CUT.: Brand: BARD-PARKER SAFETY BLADES SIZE 11, STERILE

## (undated) DEVICE — ALLENTOWN LAP CHOLE APP PACK: Brand: CARDINAL HEALTH